# Patient Record
Sex: FEMALE | Race: WHITE | NOT HISPANIC OR LATINO | Employment: STUDENT | URBAN - METROPOLITAN AREA
[De-identification: names, ages, dates, MRNs, and addresses within clinical notes are randomized per-mention and may not be internally consistent; named-entity substitution may affect disease eponyms.]

---
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
## 2021-01-25 ENCOUNTER — OFFICE VISIT (OUTPATIENT)
Dept: OBSTETRICS AND GYNECOLOGY | Facility: CLINIC | Age: 21
End: 2021-01-25

## 2021-01-25 VITALS
DIASTOLIC BLOOD PRESSURE: 68 MMHG | HEART RATE: 67 BPM | WEIGHT: 135.6 LBS | SYSTOLIC BLOOD PRESSURE: 121 MMHG | BODY MASS INDEX: 24.02 KG/M2

## 2021-01-25 DIAGNOSIS — R19.09 MASS OF INGUINAL REGION: Primary | ICD-10-CM

## 2021-01-25 LAB
BASOPHILS # BLD AUTO: 0.05 10*3/MM3 (ref 0–0.2)
BASOPHILS NFR BLD AUTO: 0.4 % (ref 0–1.5)
EOSINOPHIL # BLD AUTO: 0.18 10*3/MM3 (ref 0–0.4)
EOSINOPHIL NFR BLD AUTO: 1.6 % (ref 0.3–6.2)
ERYTHROCYTE [DISTWIDTH] IN BLOOD BY AUTOMATED COUNT: 12.9 % (ref 12.3–15.4)
HCT VFR BLD AUTO: 37 % (ref 34–46.6)
HGB BLD-MCNC: 12.5 G/DL (ref 12–15.9)
IMM GRANULOCYTES # BLD AUTO: 0.05 10*3/MM3 (ref 0–0.05)
IMM GRANULOCYTES NFR BLD AUTO: 0.4 % (ref 0–0.5)
LYMPHOCYTES # BLD AUTO: 2.32 10*3/MM3 (ref 0.7–3.1)
LYMPHOCYTES NFR BLD AUTO: 20.8 % (ref 19.6–45.3)
MCH RBC QN AUTO: 28.7 PG (ref 26.6–33)
MCHC RBC AUTO-ENTMCNC: 33.8 G/DL (ref 31.5–35.7)
MCV RBC AUTO: 84.9 FL (ref 79–97)
MONOCYTES # BLD AUTO: 1.01 10*3/MM3 (ref 0.1–0.9)
MONOCYTES NFR BLD AUTO: 9 % (ref 5–12)
NEUTROPHILS # BLD AUTO: 7.57 10*3/MM3 (ref 1.7–7)
NEUTROPHILS NFR BLD AUTO: 67.8 % (ref 42.7–76)
NRBC BLD AUTO-RTO: 0 /100 WBC (ref 0–0.2)
PLATELET # BLD AUTO: 315 10*3/MM3 (ref 140–450)
RBC # BLD AUTO: 4.36 10*6/MM3 (ref 3.77–5.28)
WBC # BLD AUTO: 11.18 10*3/MM3 (ref 3.4–10.8)

## 2021-01-25 PROCEDURE — 99214 OFFICE O/P EST MOD 30 MIN: CPT | Performed by: OBSTETRICS & GYNECOLOGY

## 2021-01-25 NOTE — PROGRESS NOTES
Chief Complaint  Ovarian Cyst- pt c/o cyst bulging and causing pain in left leg and can hardly walk.     Subjective          Ash Sr presents to [facility] for inguinal mass    History of Present Illness  Patient presents complaining of a 2-week history of a mass in her left inguinal area.  She reports that it has increased in size over the last 2 weeks and is now approximately the size of a golf ball.  She reports that it is tender and causes shooting pain down her left leg.  Patient has a Mirena IUD in place and denies any abnormal vaginal discharge or vulvar lesions.  She denies any recent infections or any history of a positive Covid test.  She states she has not had the Covid vaccine.    Objective   Vital Signs:   /68   Pulse 67   Wt 61.5 kg (135 lb 9.6 oz)   BMI 24.02 kg/m²     Physical Exam  Genitourinary:     Labia:         Right: No rash, tenderness, lesion or injury.         Left: No rash, tenderness, lesion or injury.       Vagina: Normal.      Cervix: Normal.      Comments: IUD strings visualized  Lymphadenopathy:      Lower Body: Left inguinal adenopathy (2 cm tender, mobile enlarged lymph node) present.      Review of Systems   Genitourinary: Positive for genital sores.   Musculoskeletal: Positive for gait problem.   All other systems reviewed and are negative.               Assessment and Plan    Problem List Items Addressed This Visit     None      Visit Diagnoses     Mass of inguinal region    -  Primary    Relevant Orders    RPR, Rfx Qn RPR / Confirm TP    Hepatitis B Surface Antigen    Hepatitis C Antibody    HIV-1 / O / 2 Ag / Antibody 4th Generation    NuSwab VG+ & HSV    US nonvascular extremity limited    CBC & Differential        Patient does have an enlarged, painful lymph node in the left inguinal area.  An ultrasound has been ordered to evaluate this area.  STD labs and vaginal culture were also ordered.  Further follow-up will depend on  results of testing.      Follow Up   No follow-ups on file.  Patient was given instructions and counseling regarding her condition or for health maintenance advice. Please see specific information pulled into the AVS if appropriate.

## 2021-01-26 LAB
HBV SURFACE AG SERPL QL IA: NEGATIVE
HCV AB S/CO SERPL IA: <0.1 S/CO RATIO (ref 0–0.9)
HIV 1+2 AB+HIV1 P24 AG SERPL QL IA: NON REACTIVE
RPR SER QL: NON REACTIVE

## 2021-01-27 ENCOUNTER — TELEPHONE (OUTPATIENT)
Dept: OBSTETRICS AND GYNECOLOGY | Facility: CLINIC | Age: 21
End: 2021-01-27

## 2021-01-27 NOTE — TELEPHONE ENCOUNTER
----- Message from Thelma Xavier MD sent at 1/26/2021 12:26 PM EST -----  Let patient know that her labs from yesterday were normal.  We'll wait on ultrasound and vaginal culture results.

## 2021-01-28 LAB
A VAGINAE DNA VAG QL NAA+PROBE: ABNORMAL SCORE
BVAB2 DNA VAG QL NAA+PROBE: ABNORMAL SCORE
C ALBICANS DNA VAG QL NAA+PROBE: NEGATIVE
C GLABRATA DNA VAG QL NAA+PROBE: NEGATIVE
C TRACH DNA VAG QL NAA+PROBE: NEGATIVE
HSV1 DNA SPEC QL NAA+PROBE: NEGATIVE
HSV2 DNA SPEC QL NAA+PROBE: NEGATIVE
MEGA1 DNA VAG QL NAA+PROBE: ABNORMAL SCORE
N GONORRHOEA DNA VAG QL NAA+PROBE: NEGATIVE
T VAGINALIS DNA VAG QL NAA+PROBE: NEGATIVE

## 2021-01-29 ENCOUNTER — TELEPHONE (OUTPATIENT)
Dept: OBSTETRICS AND GYNECOLOGY | Facility: CLINIC | Age: 21
End: 2021-01-29

## 2021-01-29 RX ORDER — METRONIDAZOLE 500 MG/1
500 TABLET ORAL 2 TIMES DAILY
Qty: 14 TABLET | Refills: 0 | Status: SHIPPED | OUTPATIENT
Start: 2021-01-29 | End: 2021-02-05

## 2021-01-29 NOTE — TELEPHONE ENCOUNTER
----- Message from Thelma Xavier MD sent at 1/29/2021  9:23 AM EST -----  Let patient know that her vaginal culture was positive for bacterial vaginosis and prescription has been sent.

## 2021-04-12 ENCOUNTER — TELEPHONE (OUTPATIENT)
Dept: OBSTETRICS AND GYNECOLOGY | Facility: CLINIC | Age: 21
End: 2021-04-12

## 2021-04-12 RX ORDER — ESTRADIOL 2 MG/1
2 TABLET ORAL DAILY
Qty: 7 TABLET | Refills: 0 | Status: SHIPPED | OUTPATIENT
Start: 2021-04-12 | End: 2021-04-19

## 2021-04-12 NOTE — TELEPHONE ENCOUNTER
Irregular bleeding can be normal with the Nexplanon.  I have sent in a prescription for 7 days of estradiol to see if it helps slow or stop this episode of bleeding.

## 2021-04-12 NOTE — TELEPHONE ENCOUNTER
Pt called with c/o bleeding very heavily for the past week, with mild cramping, no clots.  Had nexplanon insertion about 6 months ago.  She had abnormal bleeding in the beginning, which you told her to expect.  But now concerned because she has gone through 1 box of super tampons in 2 days.  Reports she bled through a super/jumpo tampon a rag and her pants within 1 1/2 hours.     Please advise.     [phone]

## 2021-04-17 ENCOUNTER — DOCUMENTATION (OUTPATIENT)
Dept: OBSTETRICS AND GYNECOLOGY | Facility: CLINIC | Age: 21
End: 2021-04-17

## 2021-04-17 NOTE — PROGRESS NOTES
Patient called the answering service on Saturday morning 4/17/2021.  She states that her bleeding is continued and her cramping is increased and she feels a heaviness in her pelvis.  On review with the patient, she has an IUD in which is a Mirena and not a Nexplanon as indicated in the recent note.  I told her to err on the side of doing a home pregnancy test and that if her pain and/or bleeding are severe over the weekend she needs to go to the emergency room.  I think she is fine to simply call the office in 2 days and see about an appointment to get evaluated for the location of the IUD and even possibility of an expulsion.  She is comfortable with this plan.

## 2021-04-21 ENCOUNTER — TELEPHONE (OUTPATIENT)
Dept: OBSTETRICS AND GYNECOLOGY | Facility: CLINIC | Age: 21
End: 2021-04-21

## 2021-04-21 ENCOUNTER — DOCUMENTATION (OUTPATIENT)
Dept: OBSTETRICS AND GYNECOLOGY | Facility: CLINIC | Age: 21
End: 2021-04-21

## 2021-04-22 NOTE — TELEPHONE ENCOUNTER
Couldn't make appointment for Shay today and cannot go to [place] tomorrow due to work. Scheduled for Monday at [place] and states that if she has more pain she will go to the ER

## 2021-04-22 NOTE — TELEPHONE ENCOUNTER
Patient called on call  complaining of severe cramps and bleeding. Had Mirena IUD placed in November.  Called on call service on 4/17 with same complaints and was advised to take a pregnancy test and call next business day if negative and schedule an appt.  She states her pregnancy test was negative.  She never called the office to schedule followup.  She reports severe cramps tonight and heavy bleeding.  She was advised to use tylenol and motrin for cramps but was advised to go to ED if pain is severe or bleeding is heavy.  She verbalized understanding.    Sebastián, can you please call her and schedule her for ultrasound to see if IUD has migrated?  She may need to see Erin to have it removed if it is malpositioned.

## 2021-12-27 ENCOUNTER — TELEPHONE (OUTPATIENT)
Dept: OBSTETRICS AND GYNECOLOGY | Facility: CLINIC | Age: 21
End: 2021-12-27

## 2021-12-27 DIAGNOSIS — Z32.01 POSITIVE URINE PREGNANCY TEST: Primary | ICD-10-CM

## 2022-01-21 ENCOUNTER — TELEPHONE (OUTPATIENT)
Dept: OBSTETRICS AND GYNECOLOGY | Facility: CLINIC | Age: 22
End: 2022-01-21

## 2022-02-09 ENCOUNTER — TELEPHONE (OUTPATIENT)
Dept: OBSTETRICS AND GYNECOLOGY | Facility: CLINIC | Age: 22
End: 2022-02-09

## 2022-02-09 NOTE — TELEPHONE ENCOUNTER
She has called multiple times over the last year with this issue and we have tried to see her in the office, but she does not show for her appointments.  Please let her know that it is very important that she show to her appointment on March 7.

## 2022-02-09 NOTE — TELEPHONE ENCOUNTER
Juanito Xavier,  Pt calling to state that she went to the ER last night to [facility] (records trying to pull through [facility]) . She was passing blood clots (small) and has been bleeding on and off since having her Mirena inserted. The abdominal cramping got worse in the past month, which is why she went. The doctor suggested she birth control devices. An appt was made with you for 3/7/22. Please advise if she needs to be seen sooner.  Thank you,  Alejandra

## 2022-03-10 ENCOUNTER — TELEPHONE (OUTPATIENT)
Dept: OBSTETRICS AND GYNECOLOGY | Facility: CLINIC | Age: 22
End: 2022-03-10

## 2022-04-21 ENCOUNTER — TELEPHONE (OUTPATIENT)
Dept: OBSTETRICS AND GYNECOLOGY | Facility: CLINIC | Age: 22
End: 2022-04-21

## 2024-01-01 NOTE — TELEPHONE ENCOUNTER
----- Message from Rosemary Edgar MA sent at 10/14/2020  4:42 PM EDT -----  L/m for pt/yunior  ----- Message -----  From: Chris Lopez MD  Sent: 10/14/2020   1:30 PM EDT  To: RAFAEL Price Erin patient. STD work up blood negative (HIV, Syphilis, Hep A, B and C). NuSwab with BV - all rest negative. I sent flagyl to treat and UTI with GBS - I sent keflex to treat. Would likely treat UTI then discharge in that order. Please let her know. Thanks, Dr. Lopez    
Pt is aware/yunior  
hard copy, drawn during this pregnancy

## 2025-05-22 NOTE — PROGRESS NOTES
Attempted to contact patient on 5/22/25 at 8:53 for previsit. LV reminding patient to complete labs before his scheduled visit.   Ultrasound completed....SP